# Patient Record
Sex: FEMALE | Race: WHITE
[De-identification: names, ages, dates, MRNs, and addresses within clinical notes are randomized per-mention and may not be internally consistent; named-entity substitution may affect disease eponyms.]

---

## 2020-11-02 ENCOUNTER — HOSPITAL ENCOUNTER (OUTPATIENT)
Dept: HOSPITAL 65 - RAD | Age: 55
Discharge: HOME | End: 2020-11-02
Attending: NURSE PRACTITIONER
Payer: COMMERCIAL

## 2020-11-02 DIAGNOSIS — M25.551: ICD-10-CM

## 2020-11-02 DIAGNOSIS — M25.552: Primary | ICD-10-CM

## 2020-11-02 PROCEDURE — 73502 X-RAY EXAM HIP UNI 2-3 VIEWS: CPT
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
## 2020-11-02 NOTE — DIREP
PROCEDURE:XRAY HIP MIN 2VW-RT

 

COMPARISON:[facility], CR, XRAY HIP MIN 2VW-LT, 11/02/2020, 10:50 

AM.

 

INDICATIONS:M25.551 PAIN IN RIGHT HIP

 

FINDINGS:

BONES:Normal.

JOINTS:Normal.

SOFT TISSUES:Normal.

OTHER:No additional findings.

 

CONCLUSION:Normal examination.  

 

 

 

Dictated by: Vinicius Alonso M.D. on 11/02/2020 at 01:09 PM     

Electronically Signed By: Vinicius Alonso M.D. on 11/02/2020 at 01:10 PM

## 2021-03-19 ENCOUNTER — HOSPITAL ENCOUNTER (OUTPATIENT)
Dept: HOSPITAL 65 - LAB | Age: 56
Discharge: HOME | End: 2021-03-19
Attending: NURSE PRACTITIONER
Payer: COMMERCIAL

## 2021-03-19 DIAGNOSIS — R53.83: Primary | ICD-10-CM

## 2021-03-19 DIAGNOSIS — E55.9: ICD-10-CM

## 2021-03-19 DIAGNOSIS — N91.2: ICD-10-CM

## 2021-03-19 LAB
ALP INTEST CFR SERPL: 103 U/L (ref 50–136)
ALT SERPL-CCNC: 41 U/L (ref 12–78)
AST SERPL-CCNC: 16 U/L (ref 0–35)
BASOPHILS # BLD AUTO: 0.1 10^3/UL (ref 0–0.1)
BASOPHILS NFR BLD AUTO: 1.2 % (ref 0–0.2)
CALCIUM SERPL-MCNC: 9.1 MG/DL (ref 8.4–10.5)
CHOLEST SERPL-MCNC: 268 MG/DL (ref 120–240)
CO2 BLDA-SCNC: 26.9 MMOL/L (ref 20–32)
EOSINOPHIL # BLD AUTO: 0.3 10^3/UL (ref 0–0.2)
EOSINOPHIL NFR BLD AUTO: 5.7 % (ref 0–5)
ERYTHROCYTE [DISTWIDTH] IN BLOOD BY AUTOMATED COUNT: 14.2 % (ref 11.5–14.5)
GLUCOSE PRE 100 G GLC PO SERPL-MCNC: 111 MG/DL (ref 70–110)
HDLC SERPL-MCNC: 85 MG/DL (ref 32–96)
LYMPHOCYTES # BLD AUTO: 2.22 10^3/UL1 (ref 1–4.8)
LYMPHOCYTES NFR BLD AUTO: 38.3 % (ref 24–44)
MCH RBC QN AUTO: 25.8 PG (ref 26–34)
MONOCYTES # BLD AUTO: 0.5 10^3/UL (ref 0.3–0.8)
MONOCYTES NFR BLD AUTO: 8.8 % (ref 5–12)
NEUTROPHILS # BLD AUTO: 2.7 10^3/UL (ref 1.8–7.7)
NEUTROPHILS NFR BLD AUTO: 46 % (ref 41–85)
PLATELET # BLD AUTO: 396 10^3/UL (ref 150–400)

## 2021-03-19 PROCEDURE — 82306 VITAMIN D 25 HYDROXY: CPT

## 2021-03-19 PROCEDURE — 36415 COLL VENOUS BLD VENIPUNCTURE: CPT

## 2021-03-19 PROCEDURE — 80053 COMPREHEN METABOLIC PANEL: CPT

## 2021-03-19 PROCEDURE — 80061 LIPID PANEL: CPT

## 2021-03-19 PROCEDURE — 83001 ASSAY OF GONADOTROPIN (FSH): CPT

## 2021-03-19 PROCEDURE — 84439 ASSAY OF FREE THYROXINE: CPT

## 2021-03-19 PROCEDURE — 83036 HEMOGLOBIN GLYCOSYLATED A1C: CPT

## 2021-03-19 PROCEDURE — 85025 COMPLETE CBC W/AUTO DIFF WBC: CPT

## 2021-03-19 PROCEDURE — 84443 ASSAY THYROID STIM HORMONE: CPT

## 2021-03-20 LAB — FSH SERPL-ACNC: 80.3 MIU/ML

## 2021-03-25 ENCOUNTER — HOSPITAL ENCOUNTER (OUTPATIENT)
Dept: HOSPITAL 65 - BD | Age: 56
Discharge: HOME | End: 2021-03-25
Attending: NURSE PRACTITIONER
Payer: COMMERCIAL

## 2021-03-25 DIAGNOSIS — M85.88: ICD-10-CM

## 2021-03-25 DIAGNOSIS — Z13.820: Primary | ICD-10-CM

## 2021-03-25 PROCEDURE — 77080 DXA BONE DENSITY AXIAL: CPT

## 2021-03-25 NOTE — DIREP
PROCEDURE:BONE DENSITY PERIPHERAL

INDICATIONS:OSTEOPOROSIS

 

COMPARISON:None.

 

FINDINGS:

Femur

 

Proximal RIGHT femur bone mineral density (BMD) (g/cm2):  0.945 T-score : 

-0.5



Proximal LEFT femur bone mineral density (BMD) (g/cm2):  0.932T-score: -0.6



 

Lumbar

 

Lumbar bone mineral density (BMD) (g/cm2):   1.127T-score : -0.5



 

Imaging- No significant findings

 

 

CONCLUSION:

1.  Osteopenia left femoral neck, T-score:-1.3.  Osteopenia right femoral neck, 

T-score:-1.1.  Overall bone mineral density bilateral hips within normal 

limits.  Normal bone mineral density lumbar spine.

 

2. Based on the [place] FRAX study, the patient's 10-year probability of a 

major osteoporotic fracture

(clinical spine, forearm, hip or shoulder) is 11.0%, and the 10-year 

probability of a hip fracture is 0.8%.

 

SUGGESTED RECOMMENDATIONS:

Normal & Osteopenia:Consideration should be given to use of calcium 

supplementation, daily multiple vitamins and adequate exercise, as preventive 

measures against osteoporosis, if clinically indicated.

 

Osteoporosis & Severe Osteoporosis:In addition to the above, consideration 

should be given to medical therapy against osteoporosis, if clinically 

indicated.

 

 

Dictated by: Jim Wagner M.D. on 03/25/2021 at 04:35 PM     

Electronically Signed By: Jim Wagner M.D. on 03/25/2021 at 04:37 PM

## 2021-12-30 ENCOUNTER — HOSPITAL ENCOUNTER (EMERGENCY)
Dept: HOSPITAL 65 - ER | Age: 56
Discharge: HOME | End: 2021-12-30
Payer: COMMERCIAL

## 2021-12-30 VITALS — WEIGHT: 178 LBS | BODY MASS INDEX: 30.39 KG/M2 | HEIGHT: 64 IN

## 2021-12-30 VITALS — SYSTOLIC BLOOD PRESSURE: 164 MMHG | DIASTOLIC BLOOD PRESSURE: 99 MMHG

## 2021-12-30 DIAGNOSIS — Z90.49: ICD-10-CM
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
## 2021-12-30 DIAGNOSIS — S30.1XXA: Primary | ICD-10-CM
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
## 2021-12-30 DIAGNOSIS — Z88.0: ICD-10-CM

## 2021-12-30 DIAGNOSIS — Z79.899: ICD-10-CM

## 2021-12-30 LAB
BASOPHILS # BLD AUTO: 0.1 10^3/UL (ref 0–0.1)
BASOPHILS NFR BLD AUTO: 0.7 % (ref 0–0.2)
CO2 BLDA-SCNC: 28.5 MMOL/L (ref 20–32)
EOSINOPHIL # BLD AUTO: 0.3 10^3/UL (ref 0–0.2)
EOSINOPHIL NFR BLD AUTO: 3.5 % (ref 0–5)
ERYTHROCYTE [DISTWIDTH] IN BLOOD BY AUTOMATED COUNT: 12.8 % (ref 11.5–14.5)
GLUCOSE PRE 100 G GLC PO SERPL-MCNC: 103 MG/DL (ref 70–110)
LYMPHOCYTES # BLD AUTO: 1.73 10^3/UL1 (ref 1–4.8)
LYMPHOCYTES NFR BLD AUTO: 20.9 % (ref 24–44)
MCH RBC QN AUTO: 29.7 PG (ref 26–34)
MONOCYTES # BLD AUTO: 0.6 10^3/UL (ref 0.3–0.8)
MONOCYTES NFR BLD AUTO: 6.9 % (ref 5–12)
NEUTROPHILS # BLD AUTO: 5.6 10^3/UL (ref 1.8–7.7)
NEUTROPHILS NFR BLD AUTO: 67.8 % (ref 41–85)
PLATELET # BLD AUTO: 329 10^3/UL (ref 150–400)

## 2021-12-30 PROCEDURE — 36415 COLL VENOUS BLD VENIPUNCTURE: CPT

## 2021-12-30 PROCEDURE — 99285 EMERGENCY DEPT VISIT HI MDM: CPT

## 2021-12-30 PROCEDURE — 80048 BASIC METABOLIC PNL TOTAL CA: CPT

## 2021-12-30 PROCEDURE — 74177 CT ABD & PELVIS W/CONTRAST: CPT

## 2021-12-30 PROCEDURE — 85025 COMPLETE CBC W/AUTO DIFF WBC: CPT

## 2021-12-30 PROCEDURE — 80053 COMPREHEN METABOLIC PANEL: CPT
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
## 2022-03-16 ENCOUNTER — HOSPITAL ENCOUNTER (OUTPATIENT)
Dept: HOSPITAL 65 - LAB | Age: 57
Discharge: HOME | End: 2022-03-16
Attending: NURSE PRACTITIONER
Payer: COMMERCIAL

## 2022-03-16 DIAGNOSIS — R73.9: ICD-10-CM

## 2022-03-16 DIAGNOSIS — E55.9: ICD-10-CM

## 2022-03-16 DIAGNOSIS — I10: Primary | ICD-10-CM

## 2022-03-16 LAB
BASOPHILS # BLD AUTO: 0.1 10^3/UL (ref 0–0.1)
BASOPHILS NFR BLD AUTO: 1.6 % (ref 0–0.2)
CO2 BLDA-SCNC: 29.8 MMOL/L (ref 20–32)
EOSINOPHIL # BLD AUTO: 0.3 10^3/UL (ref 0–0.2)
EOSINOPHIL NFR BLD AUTO: 6.4 % (ref 0–5)
ERYTHROCYTE [DISTWIDTH] IN BLOOD BY AUTOMATED COUNT: 12.8 % (ref 11.5–14.5)
GLUCOSE PRE 100 G GLC PO SERPL-MCNC: 106 MG/DL (ref 70–110)
LYMPHOCYTES # BLD AUTO: 1.81 10^3/UL1 (ref 1–4.8)
LYMPHOCYTES NFR BLD AUTO: 37.1 % (ref 24–44)
MCH RBC QN AUTO: 29.8 PG (ref 26–34)
MONOCYTES # BLD AUTO: 0.4 10^3/UL (ref 0.3–0.8)
MONOCYTES NFR BLD AUTO: 8.2 % (ref 5–12)
NEUTROPHILS # BLD AUTO: 2.3 10^3/UL (ref 1.8–7.7)
NEUTROPHILS NFR BLD AUTO: 46.7 % (ref 41–85)
PLATELET # BLD AUTO: 329 10^3/UL (ref 150–400)

## 2022-03-16 PROCEDURE — 83036 HEMOGLOBIN GLYCOSYLATED A1C: CPT

## 2022-03-16 PROCEDURE — 84443 ASSAY THYROID STIM HORMONE: CPT

## 2022-03-16 PROCEDURE — 84439 ASSAY OF FREE THYROXINE: CPT

## 2022-03-16 PROCEDURE — 36415 COLL VENOUS BLD VENIPUNCTURE: CPT

## 2022-03-16 PROCEDURE — 82306 VITAMIN D 25 HYDROXY: CPT

## 2022-03-16 PROCEDURE — 85025 COMPLETE CBC W/AUTO DIFF WBC: CPT

## 2022-03-16 PROCEDURE — 80053 COMPREHEN METABOLIC PANEL: CPT

## 2022-03-16 PROCEDURE — 80050 GENERAL HEALTH PANEL: CPT

## 2022-03-16 PROCEDURE — 80061 LIPID PANEL: CPT

## 2022-08-18 ENCOUNTER — HOSPITAL ENCOUNTER (OUTPATIENT)
Dept: HOSPITAL 65 - LAB | Age: 57
Discharge: HOME | End: 2022-08-18
Attending: NURSE PRACTITIONER
Payer: COMMERCIAL

## 2022-08-18 DIAGNOSIS — R73.9: Primary | ICD-10-CM

## 2022-08-18 DIAGNOSIS — E78.01: ICD-10-CM

## 2022-08-18 DIAGNOSIS — E55.9: ICD-10-CM

## 2022-08-18 DIAGNOSIS — I10: ICD-10-CM

## 2022-08-18 LAB
BASOPHILS # BLD AUTO: 0.1 10^3/UL (ref 0–0.1)
BASOPHILS NFR BLD AUTO: 0.9 % (ref 0–0.2)
CO2 BLDA-SCNC: 28.9 MMOL/L (ref 20–32)
EOSINOPHIL # BLD AUTO: 0.3 10^3/UL (ref 0–0.2)
EOSINOPHIL NFR BLD AUTO: 4.8 % (ref 0–5)
ERYTHROCYTE [DISTWIDTH] IN BLOOD BY AUTOMATED COUNT: 12.4 % (ref 11.5–14.5)
GLUCOSE PRE 100 G GLC PO SERPL-MCNC: 94 MG/DL (ref 70–110)
LYMPHOCYTES # BLD AUTO: 2.19 10^3/UL1 (ref 1–4.8)
LYMPHOCYTES NFR BLD AUTO: 40.1 % (ref 24–44)
MCH RBC QN AUTO: 29.7 PG (ref 26–34)
MONOCYTES # BLD AUTO: 0.4 10^3/UL (ref 0.3–0.8)
MONOCYTES NFR BLD AUTO: 7.9 % (ref 5–12)
NEUTROPHILS # BLD AUTO: 2.5 10^3/UL (ref 1.8–7.7)
NEUTROPHILS NFR BLD AUTO: 46.3 % (ref 41–85)
PLATELET # BLD AUTO: 323 10^3/UL (ref 150–400)

## 2022-08-18 PROCEDURE — 84443 ASSAY THYROID STIM HORMONE: CPT

## 2022-08-18 PROCEDURE — 36415 COLL VENOUS BLD VENIPUNCTURE: CPT

## 2022-08-18 PROCEDURE — 80053 COMPREHEN METABOLIC PANEL: CPT

## 2022-08-18 PROCEDURE — 82306 VITAMIN D 25 HYDROXY: CPT

## 2022-08-18 PROCEDURE — 85025 COMPLETE CBC W/AUTO DIFF WBC: CPT

## 2022-08-18 PROCEDURE — 80061 LIPID PANEL: CPT

## 2022-08-18 PROCEDURE — 83036 HEMOGLOBIN GLYCOSYLATED A1C: CPT

## 2022-08-18 PROCEDURE — 84439 ASSAY OF FREE THYROXINE: CPT
